# Patient Record
Sex: MALE | Race: WHITE | Employment: STUDENT | URBAN - METROPOLITAN AREA
[De-identification: names, ages, dates, MRNs, and addresses within clinical notes are randomized per-mention and may not be internally consistent; named-entity substitution may affect disease eponyms.]

---

## 2019-10-29 ENCOUNTER — HOSPITAL ENCOUNTER (EMERGENCY)
Facility: CLINIC | Age: 39
Discharge: HOME | End: 2019-10-29
Admitting: PHYSICIAN ASSISTANT
Payer: COMMERCIAL

## 2019-10-29 VITALS
TEMPERATURE: 98.1 F | HEART RATE: 62 BPM | RESPIRATION RATE: 18 BRPM | SYSTOLIC BLOOD PRESSURE: 128 MMHG | DIASTOLIC BLOOD PRESSURE: 87 MMHG | OXYGEN SATURATION: 97 %

## 2019-10-29 DIAGNOSIS — S76.911A MUSCLE STRAIN OF THIGH, RIGHT, INITIAL ENCOUNTER: ICD-10-CM

## 2019-10-29 PROCEDURE — 99283 EMERGENCY DEPT VISIT LOW MDM: CPT

## 2019-10-29 RX ORDER — METHOCARBAMOL 750 MG/1
750-1500 TABLET, FILM COATED ORAL 3 TIMES DAILY PRN
Qty: 30 TABLET | Refills: 0 | Status: SHIPPED | OUTPATIENT
Start: 2019-10-29 | End: 2020-03-15

## 2019-10-29 RX ORDER — ASCORBIC ACID, VITAMIN A PALMITATE, CHOLECALCIFEROL, THIAMINE HYDROCHLORIDE, RIBOFLAVIN 5-PHOSPHATE SODIUM, PYRIDOXINE HYDROCHLORIDE, NIACINAMIDE, DEXPANTHENOL, ALPHA-TOCOPHEROL ACETATE, VITAMIN K1, FOLIC ACID, BIOTIN, CYANOCOBALAMIN 80; 2300; 400; 1.2; 1.4; 1; 17; 5; 7; .2; 140; 20; 1 MG/5ML; [IU]/5ML; [IU]/5ML; MG/5ML; MG/5ML; MG/5ML; MG/5ML; MG/5ML; [IU]/5ML; MG/5ML; UG/5ML; UG/5ML; UG/5ML
INJECTION, SOLUTION INTRAVENOUS
COMMUNITY
End: 2020-03-15

## 2019-10-29 RX ORDER — CALCIUM CARBONATE/VITAMIN D3 600 MG-10
1000 TABLET ORAL DAILY
COMMUNITY

## 2019-10-29 RX ORDER — IBUPROFEN 600 MG/1
600 TABLET, FILM COATED ORAL EVERY 8 HOURS PRN
Qty: 30 TABLET | Refills: 0 | Status: SHIPPED | OUTPATIENT
Start: 2019-10-29 | End: 2020-03-15

## 2019-10-29 ASSESSMENT — ENCOUNTER SYMPTOMS
COLOR CHANGE: 0
MYALGIAS: 1

## 2019-10-29 NOTE — ED AVS SNAPSHOT
[facility] Emergency Department  [address]  [facility] [address]  Phone:  [phone]  Fax:  [phone]                                    Pablo Zarate   MRN: [id number]    Department:  [facility] Emergency Department   Date of Visit:  10/29/2019           After Visit Summary Signature Page    I have received my discharge instructions, and my questions have been answered. I have discussed any challenges I see with this plan with the nurse or doctor.    ..........................................................................................................................................  Patient/Patient Representative Signature      ..........................................................................................................................................  Patient Representative Print Name and Relationship to Patient    ..................................................               ................................................  Date                                   Time    ..........................................................................................................................................  Reviewed by Signature/Title    ...................................................              ..............................................  Date                                               Time          22EPIC Rev 08/18

## 2019-10-29 NOTE — ED NOTES
Patient able to ambulate and move extremities. Continues to complain of leg discomfort. Denies numbness and tingling. MD in to see patient and discuss diagnosis, test results, and discharge plan. Patient meets discharge criteria. Discussed AVS with patient. Questions answered. Patient verbalized understanding. Patient reports being ready to go home. Patient discharged home by car with all necessary information.

## 2019-10-29 NOTE — ED PROVIDER NOTES
"  History     Chief Complaint:  Leg Pain    HPI   Pablo Zarate is a 39 year old male with no pertinent medical history who presents alone for evaluation of right upper leg pain, exacerbated with medial leg movement, that began yesterday after working out. Yesterday the patient was using a leg press and states he had too much weight on it and finished his rep, but the leg press did not lock and the weights fell on him. He believed he strained a muscle in his right upper thigh, but he was able to manage the pain at first. Today it was difficult to raise his leg due to pain and could ambulated a few steps before \"losing consciousness and being put into shock\". He denies any other pain or bruising.  He did not hit his head, neck, or pass out and remembers everything.  No prior hx of DVT or PE.    Allergies:  No Known Drug Allergies      Medications:    The patient is not currently taking any prescribed medications.     Past Medical History:    Degenerative disc disease, cervical  Skull fracture  Concussion  AC separation  TMJ    Past Surgical History:    [place] teeth extraction    Family History:    Father - Back problems    Social History:  The patient was unaccompanied to the ED.  Smoking Status: Current  Smokeless Tobacco: Never  Alcohol Use: Yes  Drug Use: No   Marital Status:       Review of Systems   Musculoskeletal: Positive for myalgias.   Skin: Negative for color change.   All other systems reviewed and are negative.    Physical Exam     Patient Vitals for the past 24 hrs:   BP Temp Pulse Resp SpO2   10/29/19 1018 128/87 98.1  F (36.7  C) 62 18 97 %      Physical Exam  General: Alert and cooperative with exam. Resting comfortably on gurney  Head:  Scalp is NC/AT  Eyes:  No scleral icterus, PERRL   Neck:  Normal range of motion without rigidity.  CV:  Regular rate and rhythm    No pathologic murmur, rubs, or gallops.  Resp:  Breath sounds are clear bilaterally.  No crackles, wheezes, " rhonchi.    Non-labored, no retractions or accessory muscle use  GI:  Abdomen is soft, no distension, no tenderness, no masses. No peritoneal signs.  Bowel sounds present in all quadrants  MS:  No lower extremity edema or asymmetric calf swelling.    No midline cervical, thoracic, or lumbar tenderness.  Compartments soft.  Tenderness to palpation over the right medial thigh.  Pain and weakness with adduction of the right hip.  No masses or fluctuance.  Skin:  Warm and dry, No rash or lesions noted.  2+ DP, PT pulses.  Neuro: Oriented. No gross motor deficits.    5 out of 5 strength bilaterally in knees, ankles.  Normal sensation bilaterally in legs.  Gait normal.  Psych: Awake. Alert. Normal affect. Appropriate interactions.    Emergency Department Course   Emergency Department Course:  Nursing notes and vitals reviewed.    (1031)   I performed an exam of the patient as documented above. History obtained from patient.    Findings and plan explained to the Patient. Patient discharged home with instructions regarding supportive care, medications, and reasons to return. The importance of close follow-up was reviewed. The patient was prescribed Robaxin and Advil. I personally answered all related questions prior to discharge.    Impression & Plan      Medical Decision Makin-year-old male who presents with right thigh pain following working out yesterday.  Patient history and records reviewed.   My impression is strain of hip adductor muscle of right medial thigh.  There is no clinical evidence of DVT, compartment syndrome, vascular, or infectious etiologies.  No evidence of fracture or dislocation.  Recommended conservative treatment with NSAIDs, muscle relaxers, follow-up with physical therapy.  We discussed that unfortunately we are not able to provide emergent MRI for this scenario, however he understands that he may follow-up with orthopedics if symptoms are not improving at which time future outpatient MRI  could be considered.  He will return if any new or worsening symptoms develop especially redness, fever, leg swelling, inability to ambulate etc.    Diagnosis:    ICD-10-CM    1. Muscle strain of thigh, right, initial encounter S76.911A       Disposition:   Discharge.    Discharge Medications:     Medication List      Started    ibuprofen 600 MG tablet  Commonly known as:  ADVIL/MOTRIN  600 mg, Oral, EVERY 8 HOURS PRN     methocarbamol 750 MG tablet  Commonly known as:  ROBAXIN  750-1,500 mg, Oral, 3 TIMES DAILY PRN            Scribe Disclosure:  IAdonis, am serving as a scribe at 10:36 AM on 10/29/2019 to document services personally performed by Matheus Solis PA-C based on my observations and the provider's statements to me.  10/29/2019   [facility] EMERGENCY DEPARTMENT       Matheus Solis PA-C  10/29/19 1052

## 2019-10-29 NOTE — ED TRIAGE NOTES
Presents to the ED with right upper leg pain. Began after working out yesterday and was exacerbated today.

## 2020-03-05 ENCOUNTER — HOSPITAL ENCOUNTER (EMERGENCY)
Facility: CLINIC | Age: 40
Discharge: HOME | End: 2020-03-05
Attending: PHYSICIAN ASSISTANT | Admitting: PHYSICIAN ASSISTANT
Payer: COMMERCIAL

## 2020-03-05 VITALS
RESPIRATION RATE: 18 BRPM | DIASTOLIC BLOOD PRESSURE: 89 MMHG | SYSTOLIC BLOOD PRESSURE: 119 MMHG | OXYGEN SATURATION: 97 % | TEMPERATURE: 97 F

## 2020-03-05 DIAGNOSIS — M54.41 RIGHT-SIDED LOW BACK PAIN WITH RIGHT-SIDED SCIATICA: ICD-10-CM

## 2020-03-05 PROCEDURE — 99283 EMERGENCY DEPT VISIT LOW MDM: CPT

## 2020-03-05 RX ORDER — METHOCARBAMOL 750 MG/1
750 TABLET, FILM COATED ORAL 4 TIMES DAILY PRN
Qty: 20 TABLET | Refills: 0 | Status: SHIPPED | OUTPATIENT
Start: 2020-03-05 | End: 2020-03-15

## 2020-03-05 RX ORDER — METHYLPREDNISOLONE 4 MG
TABLET, DOSE PACK ORAL
Qty: 21 TABLET | Refills: 0 | Status: SHIPPED | OUTPATIENT
Start: 2020-03-05 | End: 2020-03-15

## 2020-03-05 ASSESSMENT — ENCOUNTER SYMPTOMS
ROS GI COMMENTS: DENIES BOWEL INCONTINENCE
FEVER: 0
VOMITING: 0
NUMBNESS: 0
CHILLS: 0
BACK PAIN: 1

## 2020-03-05 NOTE — ED AVS SNAPSHOT
[facility] Emergency Department  [address]  [facility] [address]  Phone:  [phone]  Fax:  [phone]                                    Pablo Zarate   MRN: [id number]    Department:  [facility] Emergency Department   Date of Visit:  3/5/2020           After Visit Summary Signature Page    I have received my discharge instructions, and my questions have been answered. I have discussed any challenges I see with this plan with the nurse or doctor.    ..........................................................................................................................................  Patient/Patient Representative Signature      ..........................................................................................................................................  Patient Representative Print Name and Relationship to Patient    ..................................................               ................................................  Date                                   Time    ..........................................................................................................................................  Reviewed by Signature/Title    ...................................................              ..............................................  Date                                               Time          22EPIC Rev 08/18

## 2020-03-06 NOTE — ED PROVIDER NOTES
History     Chief Complaint:  Back Pain      HPI   Pablo Zarate is a 40 year old male with a history of mechanical low back pain, cervical spine pain, thoracic spine pain, and degenerative disc disease, cervical who presents to the emergency department for evaluation of back pain that radiates down his legs. The patient was sitting in a car with weight resting on his lap earlier today. He states that his back was unsupported during this and now hurts. Pain is worse with movement and bending and he localizes this to the right lower back.  The patient denies fever, chills, vomiting, numbness, trauma, or incontinence/retention.  No dysuria.  No blood thinner use or recent surgeries.  No abdominal pain.    Allergies:  No known drug allergies     Medications:    The patient is not currently taking any prescribed medications.    Past Medical History:    Mechanical low back pain  Cervical spine pain  Thoracic spine pain  Angiokeratoma of scrotum  Neck injury  Degenerative disc disease, cervical  TMJ  Skull fracture  Concussion  AC separation  Injury of jaw    Past Surgical History:    [place] teeth extraction    Family History:    Back problems    Social History:  The patient presents today alone.  Never smoker  Positive for alcohol use.   Marital Status:       Review of Systems   Constitutional: Negative for chills and fever.   Gastrointestinal: Negative for vomiting.        Denies bowel incontinence   Genitourinary:        Denies urinary incontinence   Musculoskeletal: Positive for back pain.        Bilateral leg pain   Neurological: Negative for numbness.   All other systems reviewed and are negative.    Physical Exam     Patient Vitals for the past 24 hrs:   BP Temp Heart Rate Resp SpO2   03/05/20 2045 119/89 97  F (36.1  C) 79 18 97 %     Physical Exam  General: Alert and cooperative with exam. Resting comfortably on gurney  Head:  Scalp is NC/AT  Eyes:  No scleral icterus, PERRL  ENT:  The external  nose and ears are normal.   Neck:  Normal range of motion without rigidity.  CV:  Regular rate and rhythm    No pathologic murmur, rubs, or gallops.  Resp:  Breath sounds are clear bilaterally.  No crackles, wheezes, rhonchi, stridor.    Non-labored, no retractions or accessory muscle use  GI:  Abdomen is soft, no distension, no tenderness, no masses. No peritoneal signs.  Bowel sounds present in all quadrants  :  No suprapubic or flank tenderness  MS:  No lower extremity edema or asymmetric calf swelling.    No midline cervical, thoracic, or lumbar tenderness  Skin:  Warm and dry, No rash or lesions noted.  Neuro: Oriented. No gross motor deficits.    5/5 strength BL in LE.  Normal gait.  Normal sensation BL in all dermatomes.  Psych: Awake. Alert. Normal affect. Appropriate interactions.    Emergency Department Course     Emergency Department Course:    2047 Nursing notes and vitals reviewed.    2056 I performed an exam of the patient as documented above.     Findings and plan explained to the Patient. Patient discharged home with instructions regarding supportive care, medications, and reasons to return. The importance of close follow-up was reviewed. The patient was prescribed Robaxin and Medrol Dosepak.    Impression & Plan     Medical Decision Making:  This patient presented with low back pain.  Patient is well appearing with normal vitals.  Pain has improved with interventions in the emergency department.  The patient did not sustain any trauma and has no midline spinous tenderness.  Therefore x-rays are not necessary due to the low likelihood of fracture or subluxation. The patient has not had fever, chills, IV drug use, saddle anesthesia, or bowel or bladder dysfunction.  No history or symptoms of malignancy and no recent surgical intervention.  There is no clinical evidence of cauda equina syndrome, spinal epidural abscess, osteomyelitis, cord compression. No evidence of abdominal pain/tenderness or  urinary symptoms and doubt referred pain from GI or  cause.    The neurovascular exam is normal and the patient's symptoms are consistent with musculoskeletal cause with radicular component. The patient will be discharged with medications as below to use as directed, and advised to use OTC analgesics.  Ice or heat to the back and stretching exercises.  No heavy lifting, bending or twisting. Return if increasing pain, numbness, weakness, fever, chills, or bowel or bladder dysfunction.  They should schedule follow-up with their doctor within 3 days.    Discharge Diagnosis:    ICD-10-CM    1. Right-sided low back pain with right-sided sciatica M54.41      Disposition:  The patient is discharged to home.     Discharge Medications:  New Prescriptions    METHOCARBAMOL (ROBAXIN) 750 MG TABLET    Take 1 tablet (750 mg) by mouth 4 times daily as needed for muscle spasms    METHYLPREDNISOLONE (MEDROL DOSEPAK) 4 MG TABLET THERAPY PACK    Follow Package Directions     Scribe Disclosure:  Antonio MCNALLY, am serving as a scribe at 8:50 PM on 3/5/2020 to document services personally performed by Matheus Solis PA based on my observations and the provider's statements to me.      Matheus Solis PA-C  03/05/20 8148

## 2020-03-06 NOTE — ED TRIAGE NOTES
"Patient states, \" I was carrying something, It wasn't heavy or anything and then soon after fell asleep in my car . \" Reports slouching.     Denies taking OTC meds     Denies injuries   "

## 2020-03-11 ENCOUNTER — HEALTH MAINTENANCE LETTER (OUTPATIENT)
Age: 40
End: 2020-03-11

## 2020-03-15 ENCOUNTER — HOSPITAL ENCOUNTER (EMERGENCY)
Facility: CLINIC | Age: 40
Discharge: HOME | End: 2020-03-15
Attending: EMERGENCY MEDICINE | Admitting: EMERGENCY MEDICINE
Payer: COMMERCIAL

## 2020-03-15 ENCOUNTER — APPOINTMENT (OUTPATIENT)
Dept: CT IMAGING | Facility: CLINIC | Age: 40
End: 2020-03-15
Attending: EMERGENCY MEDICINE
Payer: COMMERCIAL

## 2020-03-15 VITALS
SYSTOLIC BLOOD PRESSURE: 125 MMHG | OXYGEN SATURATION: 96 % | DIASTOLIC BLOOD PRESSURE: 68 MMHG | HEART RATE: 63 BPM | RESPIRATION RATE: 18 BRPM | TEMPERATURE: 97.9 F

## 2020-03-15 DIAGNOSIS — R51.9 UNILATERAL OCCIPITAL HEADACHE: ICD-10-CM

## 2020-03-15 PROCEDURE — 99284 EMERGENCY DEPT VISIT MOD MDM: CPT | Mod: 25

## 2020-03-15 PROCEDURE — 70450 CT HEAD/BRAIN W/O DYE: CPT

## 2020-03-15 NOTE — ED PROVIDER NOTES
History     Chief Complaint:  Headache      HPI   Pablo Zarate is a 40 year old male with a history of cervical disc disease who presents with a right-sided headache and right neck pain that woke him from his sleep. He describes this as a significant burning and throbbing pressure behind his right eye. He says this pain is worse even with breathing. He denies recent trauma or falls. No fevers, focal numbness or weakness, speech difficulty, or vision problems. Patient endorses recent steroid and muscle relaxer use for back pain prescribed 3/5. He has not taken anything for pain today.     Allergies:  No known drug allergies     Medications:    The patient is currently on no regular medications.     Past Medical History:    Cervical degenerative disc disease  Chronic low back pain  TMJ    Past Surgical History:    [place] teeth      Family History:    History reviewed. No pertinent family history.      Social History:  Patient presents alone.  PCP: Jo Ann Nassar MD    Marital Status:      Review of Systems   All other systems reviewed and are negative.    Physical Exam     Patient Vitals for the past 24 hrs:   BP Temp Pulse Resp SpO2   03/15/20 0445 125/68 -- 63 18 96 %   03/15/20 0430 117/76 -- 67 -- 93 %   03/15/20 0419 -- -- -- -- 97 %   03/15/20 0418 -- -- -- -- 98 %   03/15/20 0417 115/92 -- 68 -- --   03/15/20 0256 128/82 97.9  F (36.6  C) 67 16 98 %     Physical Exam  Nursing note and vitals reviewed.  Constitutional: Cooperative.   HENT:   Mouth/Throat: Mucous membranes are normal.   Full ROM of neck.   Eyes: Pupils are equal, round, and reactive to light. EOMI  Cardiovascular: Normal rate, regular rhythm and normal heart sounds.  No murmur.  Pulmonary/Chest: Effort normal and breath sounds normal. No respiratory distress. No wheezes.   Neurological: Alert. 5/5 strength. Cranial nerves II-XII intact. Sensation normal.  Oriented x4   Skin: Skin is warm and dry.   Psychiatric: Normal mood  and affect.      Emergency Department Course     Imaging:   Radiographic findings were communicated with the patient who voiced understanding of the findings.     CT-scan Head w/o contrast:  No acute intracranial process.   Result per radiology.      Emergency Department Course:  Past medical records, nursing notes, and vitals reviewed.  0426: I performed an exam of the patient and obtained history, as documented above.    The patient was sent for a head CT while in the emergency department, findings above.     0538: Findings and plan explained to the Patient. Patient discharged home with instructions regarding supportive care, medications, and reasons to return. The importance of close follow-up was reviewed.    I personally reviewed the imaging results with the Patient and answered all related questions prior to discharge.     Impression & Plan     Medical Decision Making:  Pablo Zarate is a 40 year old male who presents with a right-sided headache. He has had some pain behind the eye. No falls or trauma. No fevers or concern for infectious etiology. Scan of the head is negative for intracranial mass, lesions, bleeds, or other abnormalities. He has a normal neurologic exam. Without intervention, his symptoms have essentially resolved. His findings are most consistent with occipital nerve compression and resulting headache. Unlikely to represent life threatening cause given patient's well appearance and exam. He is comfortable with plan and will be discharged home.     Diagnosis:    ICD-10-CM    1. Unilateral occipital headache  R51       Disposition:  Discharged to home.    Scribe Disclosure:  IJones Chi, am serving as a scribe at 4:18 AM on 3/15/2020 to document services personally performed by Bart Gil MD based on my observations and the provider's statements to me.      Jones Okeefe   3/15/2020   [facility] EMERGENCY DEPARTMENT     Bart Gil MD  03/15/20 0716

## 2020-03-15 NOTE — ED AVS SNAPSHOT
[facility] Emergency Department  [address]  [facility] [address]  Phone:  [phone]  Fax:  [phone]                                    Pablo Zarate   MRN: [id number]    Department:  [facility] Emergency Department   Date of Visit:  3/15/2020           After Visit Summary Signature Page    I have received my discharge instructions, and my questions have been answered. I have discussed any challenges I see with this plan with the nurse or doctor.    ..........................................................................................................................................  Patient/Patient Representative Signature      ..........................................................................................................................................  Patient Representative Print Name and Relationship to Patient    ..................................................               ................................................  Date                                   Time    ..........................................................................................................................................  Reviewed by Signature/Title    ...................................................              ..............................................  Date                                               Time          22EPIC Rev 08/18

## 2021-01-03 ENCOUNTER — HEALTH MAINTENANCE LETTER (OUTPATIENT)
Age: 41
End: 2021-01-03

## 2021-04-25 ENCOUNTER — HEALTH MAINTENANCE LETTER (OUTPATIENT)
Age: 41
End: 2021-04-25

## 2021-10-10 ENCOUNTER — HEALTH MAINTENANCE LETTER (OUTPATIENT)
Age: 41
End: 2021-10-10

## 2022-10-10 ENCOUNTER — TELEPHONE (OUTPATIENT)
Dept: BEHAVIORAL HEALTH | Facility: CLINIC | Age: 42
End: 2022-10-10

## 2022-10-10 NOTE — TELEPHONE ENCOUNTER
10/10/22: Pt is a(n) Age Range: Adult (18+ out of high school)  seeking an Eval for Mental Health DA for evaluation and recommendations.  Pt interested in Evaluation only / No specific program requested  Appointment Scheduled by: Scheduled By: Patient. (If pt is self pay, we must complete a Cost Estimate and save it to the pt chart.)   Caller Information: Patient stated that his PO directed him to schedule MH eval  Cost estimate  DID/NOT: Did not get completed.     Trudy Jeronimo